# Patient Record
Sex: FEMALE | Race: WHITE | URBAN - METROPOLITAN AREA
[De-identification: names, ages, dates, MRNs, and addresses within clinical notes are randomized per-mention and may not be internally consistent; named-entity substitution may affect disease eponyms.]

---

## 2018-07-05 ENCOUNTER — APPOINTMENT (RX ONLY)
Dept: URBAN - METROPOLITAN AREA CLINIC 5 | Facility: CLINIC | Age: 63
Setting detail: DERMATOLOGY
End: 2018-07-05

## 2018-07-05 DIAGNOSIS — L81.4 OTHER MELANIN HYPERPIGMENTATION: ICD-10-CM

## 2018-07-05 DIAGNOSIS — L82.1 OTHER SEBORRHEIC KERATOSIS: ICD-10-CM

## 2018-07-05 DIAGNOSIS — D18.0 HEMANGIOMA: ICD-10-CM

## 2018-07-05 DIAGNOSIS — L73.8 OTHER SPECIFIED FOLLICULAR DISORDERS: ICD-10-CM

## 2018-07-05 DIAGNOSIS — B07.8 OTHER VIRAL WARTS: ICD-10-CM

## 2018-07-05 PROBLEM — D18.01 HEMANGIOMA OF SKIN AND SUBCUTANEOUS TISSUE: Status: ACTIVE | Noted: 2018-07-05

## 2018-07-05 PROCEDURE — ? COUNSELING

## 2018-07-05 PROCEDURE — 99203 OFFICE O/P NEW LOW 30 MIN: CPT

## 2018-07-05 ASSESSMENT — LOCATION ZONE DERM
LOCATION ZONE: FINGERNAIL
LOCATION ZONE: TRUNK
LOCATION ZONE: NOSE
LOCATION ZONE: LEG
LOCATION ZONE: FINGER

## 2018-07-05 ASSESSMENT — LOCATION DETAILED DESCRIPTION DERM
LOCATION DETAILED: RIGHT INDEX FINGERNAIL
LOCATION DETAILED: EPIGASTRIC SKIN
LOCATION DETAILED: LEFT INFERIOR LATERAL MIDBACK
LOCATION DETAILED: LEFT MID DORSAL INDEX FINGER
LOCATION DETAILED: LEFT MIDDLE FINGER PROXIMAL INTERPHALANGEAL JOINT
LOCATION DETAILED: NASAL TIP
LOCATION DETAILED: RIGHT ANTERIOR PROXIMAL THIGH

## 2018-07-05 ASSESSMENT — LOCATION SIMPLE DESCRIPTION DERM
LOCATION SIMPLE: NOSE
LOCATION SIMPLE: RIGHT THIGH
LOCATION SIMPLE: RIGHT INDEX FINGERNAIL
LOCATION SIMPLE: ABDOMEN
LOCATION SIMPLE: LEFT INDEX FINGER
LOCATION SIMPLE: LEFT MIDDLE FINGER
LOCATION SIMPLE: LEFT LOWER BACK

## 2018-07-05 NOTE — HPI: OTHER
Condition:: spot
Please Describe Your Condition:: One year history of asymptomatic scaly spot on left back that has enlarged.  No prior treatment.
Condition:: wart
Please Describe Your Condition:: Two month history of warts on left fingers and under right index fingernail.  No prior treatment.

## 2020-10-21 ENCOUNTER — APPOINTMENT (RX ONLY)
Dept: URBAN - METROPOLITAN AREA CLINIC 141 | Facility: CLINIC | Age: 65
Setting detail: DERMATOLOGY
End: 2020-10-21

## 2020-10-21 DIAGNOSIS — L73.9 FOLLICULAR DISORDER, UNSPECIFIED: ICD-10-CM

## 2020-10-21 DIAGNOSIS — L738 OTHER SPECIFIED DISEASES OF HAIR AND HAIR FOLLICLES: ICD-10-CM

## 2020-10-21 DIAGNOSIS — L663 OTHER SPECIFIED DISEASES OF HAIR AND HAIR FOLLICLES: ICD-10-CM

## 2020-10-21 DIAGNOSIS — L82.1 OTHER SEBORRHEIC KERATOSIS: ICD-10-CM

## 2020-10-21 DIAGNOSIS — L30.4 ERYTHEMA INTERTRIGO: ICD-10-CM

## 2020-10-21 PROBLEM — L02.821 FURUNCLE OF HEAD [ANY PART, EXCEPT FACE]: Status: ACTIVE | Noted: 2020-10-21

## 2020-10-21 PROBLEM — L02.222 FURUNCLE OF BACK [ANY PART, EXCEPT BUTTOCK]: Status: ACTIVE | Noted: 2020-10-21

## 2020-10-21 PROCEDURE — ? COUNSELING

## 2020-10-21 PROCEDURE — ? PRESCRIPTION

## 2020-10-21 PROCEDURE — 99214 OFFICE O/P EST MOD 30 MIN: CPT

## 2020-10-21 PROCEDURE — ? TREATMENT REGIMEN

## 2020-10-21 RX ORDER — FLUOCINOLONE ACETONIDE 0.1 MG/ML
SOLUTION TOPICAL
Qty: 1 | Refills: 0 | Status: ERX | COMMUNITY
Start: 2020-10-21

## 2020-10-21 RX ADMIN — FLUOCINOLONE ACETONIDE: 0.1 SOLUTION TOPICAL at 00:00

## 2020-10-21 ASSESSMENT — LOCATION DETAILED DESCRIPTION DERM
LOCATION DETAILED: RIGHT SUPERIOR UPPER BACK
LOCATION DETAILED: SUBXIPHOID
LOCATION DETAILED: LEFT MID-UPPER BACK
LOCATION DETAILED: MID-OCCIPITAL SCALP

## 2020-10-21 ASSESSMENT — LOCATION ZONE DERM
LOCATION ZONE: TRUNK
LOCATION ZONE: SCALP

## 2020-10-21 ASSESSMENT — PAIN INTENSITY VAS: HOW INTENSE IS YOUR PAIN 0 BEING NO PAIN, 10 BEING THE MOST SEVERE PAIN POSSIBLE?: NO PAIN

## 2020-10-21 ASSESSMENT — LOCATION SIMPLE DESCRIPTION DERM
LOCATION SIMPLE: ABDOMEN
LOCATION SIMPLE: LEFT UPPER BACK
LOCATION SIMPLE: POSTERIOR SCALP
LOCATION SIMPLE: RIGHT UPPER BACK

## 2020-10-21 NOTE — PROCEDURE: TREATMENT REGIMEN
Samples Given: neutrogena T-Sal shampoo x 2, apply to scalp once a week let sit 2-3 minutes on scalp before rinsing
Detail Level: Simple
Otc Regimen: aveeno anti itch lotion

## 2020-10-26 ENCOUNTER — RX ONLY (OUTPATIENT)
Age: 65
Setting detail: RX ONLY
End: 2020-10-26

## 2020-10-26 RX ORDER — DOXYCYCLINE HYCLATE 50 MG/1
CAPSULE, GELATIN COATED ORAL
Qty: 30 | Refills: 0 | Status: ERX | COMMUNITY
Start: 2020-10-26

## 2020-12-07 ENCOUNTER — APPOINTMENT (RX ONLY)
Dept: URBAN - METROPOLITAN AREA CLINIC 39 | Facility: CLINIC | Age: 65
Setting detail: DERMATOLOGY
End: 2020-12-07

## 2020-12-07 DIAGNOSIS — A60.9 ANOGENITAL HERPESVIRAL INFECTION, UNSPECIFIED: ICD-10-CM

## 2020-12-07 DIAGNOSIS — L738 OTHER SPECIFIED DISEASES OF HAIR AND HAIR FOLLICLES: ICD-10-CM

## 2020-12-07 DIAGNOSIS — B35.1 TINEA UNGUIUM: ICD-10-CM

## 2020-12-07 DIAGNOSIS — L73.9 FOLLICULAR DISORDER, UNSPECIFIED: ICD-10-CM

## 2020-12-07 DIAGNOSIS — L663 OTHER SPECIFIED DISEASES OF HAIR AND HAIR FOLLICLES: ICD-10-CM

## 2020-12-07 PROBLEM — L02.821 FURUNCLE OF HEAD [ANY PART, EXCEPT FACE]: Status: ACTIVE | Noted: 2020-12-07

## 2020-12-07 PROCEDURE — ? TREATMENT REGIMEN

## 2020-12-07 PROCEDURE — ? PRESCRIPTION

## 2020-12-07 PROCEDURE — 99213 OFFICE O/P EST LOW 20 MIN: CPT

## 2020-12-07 PROCEDURE — ? COUNSELING

## 2020-12-07 RX ORDER — SULFAMETHOXAZOLE AND TRIMETHOPRIM 800; 160 MG/1; MG/1
TABLET ORAL BID
Qty: 60 | Refills: 1 | Status: ERX | COMMUNITY
Start: 2020-12-07

## 2020-12-07 RX ORDER — CICLOPIROX 80 MG/ML
SOLUTION TOPICAL QD
Qty: 1 | Refills: 6 | Status: ERX | COMMUNITY
Start: 2020-12-07

## 2020-12-07 RX ORDER — VALACYCLOVIR HYDROCHLORIDE 500 MG/1
TABLET, FILM COATED ORAL
Qty: 18 | Refills: 3 | Status: ERX | COMMUNITY
Start: 2020-12-07

## 2020-12-07 RX ADMIN — CICLOPIROX: 80 SOLUTION TOPICAL at 00:00

## 2020-12-07 RX ADMIN — SULFAMETHOXAZOLE AND TRIMETHOPRIM: 800; 160 TABLET ORAL at 00:00

## 2020-12-07 RX ADMIN — VALACYCLOVIR HYDROCHLORIDE: 500 TABLET, FILM COATED ORAL at 00:00

## 2020-12-07 ASSESSMENT — PAIN INTENSITY VAS: HOW INTENSE IS YOUR PAIN 0 BEING NO PAIN, 10 BEING THE MOST SEVERE PAIN POSSIBLE?: NO PAIN

## 2020-12-07 ASSESSMENT — LOCATION DETAILED DESCRIPTION DERM
LOCATION DETAILED: LEFT INDEX FINGERNAIL
LOCATION DETAILED: LEFT SUPERIOR PARIETAL SCALP
LOCATION DETAILED: LEFT GREAT TOENAIL
LOCATION DETAILED: RIGHT INDEX FINGERNAIL
LOCATION DETAILED: LEFT MONS PUBIS

## 2020-12-07 ASSESSMENT — LOCATION SIMPLE DESCRIPTION DERM
LOCATION SIMPLE: SCALP
LOCATION SIMPLE: RIGHT INDEX FINGERNAIL
LOCATION SIMPLE: LEFT GREAT TOE
LOCATION SIMPLE: MONS PUBIS
LOCATION SIMPLE: LEFT INDEX FINGERNAIL

## 2020-12-07 ASSESSMENT — LOCATION ZONE DERM
LOCATION ZONE: VULVA
LOCATION ZONE: TOENAIL
LOCATION ZONE: SCALP
LOCATION ZONE: FINGERNAIL

## 2020-12-07 NOTE — PROCEDURE: TREATMENT REGIMEN
Plan: She was adamant to treat. 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 She has taken Bactrim without AE's prior and desired to repeat. Risk of skin and other multi-organ reactions, some very serious discussed.
Detail Level: Simple
Samples Given: CLN shampoo with understanding it may remove hair color to some degree.

## 2020-12-07 NOTE — PROCEDURE: COUNSELING
Detail Level: Simple
Detail Level: Zone
Patient Specific Counseling (Will Not Stick From Patient To Patient): Systemic deferred as given new Bactirm Rx already. Weekly diflucan may be future option with least AE/interaction risks.

## 2021-03-16 ENCOUNTER — APPOINTMENT (RX ONLY)
Dept: URBAN - METROPOLITAN AREA CLINIC 39 | Facility: CLINIC | Age: 66
Setting detail: DERMATOLOGY
End: 2021-03-16

## 2021-03-16 DIAGNOSIS — L73.9 FOLLICULAR DISORDER, UNSPECIFIED: ICD-10-CM

## 2021-03-16 DIAGNOSIS — L82.1 OTHER SEBORRHEIC KERATOSIS: ICD-10-CM

## 2021-03-16 DIAGNOSIS — L30.9 DERMATITIS, UNSPECIFIED: ICD-10-CM | Status: INADEQUATELY CONTROLLED

## 2021-03-16 DIAGNOSIS — L738 OTHER SPECIFIED DISEASES OF HAIR AND HAIR FOLLICLES: ICD-10-CM

## 2021-03-16 DIAGNOSIS — L663 OTHER SPECIFIED DISEASES OF HAIR AND HAIR FOLLICLES: ICD-10-CM

## 2021-03-16 PROBLEM — L02.821 FURUNCLE OF HEAD [ANY PART, EXCEPT FACE]: Status: ACTIVE | Noted: 2021-03-16

## 2021-03-16 PROCEDURE — 99213 OFFICE O/P EST LOW 20 MIN: CPT

## 2021-03-16 PROCEDURE — ? TREATMENT REGIMEN

## 2021-03-16 PROCEDURE — ? COUNSELING

## 2021-03-16 ASSESSMENT — LOCATION ZONE DERM
LOCATION ZONE: SCALP
LOCATION ZONE: TRUNK

## 2021-03-16 ASSESSMENT — LOCATION DETAILED DESCRIPTION DERM
LOCATION DETAILED: LEFT SUPERIOR PARIETAL SCALP
LOCATION DETAILED: RIGHT BUTTOCK
LOCATION DETAILED: SUPERIOR LUMBAR SPINE

## 2021-03-16 ASSESSMENT — LOCATION SIMPLE DESCRIPTION DERM
LOCATION SIMPLE: LOWER BACK
LOCATION SIMPLE: SCALP
LOCATION SIMPLE: RIGHT BUTTOCK

## 2021-03-16 ASSESSMENT — PAIN INTENSITY VAS: HOW INTENSE IS YOUR PAIN 0 BEING NO PAIN, 10 BEING THE MOST SEVERE PAIN POSSIBLE?: NO PAIN

## 2021-03-16 NOTE — HPI: RASH
Is The Patient Presenting As Previously Scheduled?: Yes
How Severe Is Your Rash?: moderate
Is This A New Presentation, Or A Follow-Up?: Rash
Additional History: Eruption is not present currently and she declined other than cursory exam. Photos on her phone show urticarial plaques on abdomen x 2 and a medial gluteal small erosion with surrounding PIHP. Photos of lesions hairline were out of focus.

## 2021-03-16 NOTE — PROCEDURE: TREATMENT REGIMEN
Detail Level: Detailed
Plan: Pt is to call/return to office once she has an active lesion, specificall like the  pink plaques on the abdomen seen on phone photo, to get a biopsy with DIF.

## 2021-03-16 NOTE — PROCEDURE: COUNSELING
Detail Level: Zone
Patient Specific Counseling (Will Not Stick From Patient To Patient): Discouraged taking Bactrim long term. As above, would be helpful to see acute outbreak.
Detail Level: Simple

## 2021-03-18 RX ORDER — SULFAMETHOXAZOLE AND TRIMETHOPRIM 800; 160 MG/1; MG/1
TABLET ORAL BID
Qty: 60 | Refills: 1 | Status: ERX | COMMUNITY
Start: 2021-03-18

## 2021-03-18 RX ADMIN — SULFAMETHOXAZOLE AND TRIMETHOPRIM: 800; 160 TABLET ORAL at 00:00

## 2021-03-25 ENCOUNTER — APPOINTMENT (RX ONLY)
Dept: URBAN - METROPOLITAN AREA CLINIC 60 | Facility: CLINIC | Age: 66
Setting detail: DERMATOLOGY
End: 2021-03-25

## 2021-03-25 DIAGNOSIS — Z41.9 ENCOUNTER FOR PROCEDURE FOR PURPOSES OTHER THAN REMEDYING HEALTH STATE, UNSPECIFIED: ICD-10-CM

## 2021-03-25 PROCEDURE — ? CHEMICAL PEEL

## 2021-03-25 ASSESSMENT — LOCATION SIMPLE DESCRIPTION DERM
LOCATION SIMPLE: INFERIOR FOREHEAD
LOCATION SIMPLE: LEFT CHEEK
LOCATION SIMPLE: NOSE
LOCATION SIMPLE: RIGHT CHEEK

## 2021-03-25 ASSESSMENT — LOCATION ZONE DERM
LOCATION ZONE: NOSE
LOCATION ZONE: FACE

## 2021-03-25 ASSESSMENT — LOCATION DETAILED DESCRIPTION DERM
LOCATION DETAILED: LEFT INFERIOR CENTRAL MALAR CHEEK
LOCATION DETAILED: NASAL DORSUM
LOCATION DETAILED: INFERIOR MID FOREHEAD
LOCATION DETAILED: RIGHT INFERIOR CENTRAL MALAR CHEEK

## 2021-03-25 NOTE — PROCEDURE: CHEMICAL PEEL
Detail Level: Zone
Chemical Peel: Micropeel 30 Solution
Treatment Number: 1
Number Of Layers: 2
Prep: Cleansed, steamed, extracted, prepped with alcohol wipe
Post Peel Care: After the procedure, a post-peel cream was applied to the treated areas. Sun protection and post-care instructions were reviewed with the patient.
Consent: Prior to the procedure, written consent was obtained and risks were reviewed, including but not limited to: redness, peeling, blistering, pigmentary change, scarring, infection, and pain.
Comments: One layer micropeel 30% glycolic, with one layer micropeel salicylic 30% on top-post extractions. Finished with hydrating b5 gel, moisturizer and ZO smart tone tinted SPF. \\n\\npt mentioned she doesn't do a whole lot at home, cleanses only once a day if that. uses [employer] products. Wants eye surgery for puffiness. I recommended that she cleanse daily, and use a retinol. She went home with skin medica retinol 0.5%. Pre-booked for IPL on the chest, neck, and face.
Post-Care Instructions: I reviewed with the patient in detail post-care instructions. Patient should avoid sun exposure and wear sun protection.

## 2021-04-09 ENCOUNTER — APPOINTMENT (RX ONLY)
Dept: URBAN - METROPOLITAN AREA CLINIC 60 | Facility: CLINIC | Age: 66
Setting detail: DERMATOLOGY
End: 2021-04-09

## 2021-04-09 DIAGNOSIS — Z41.9 ENCOUNTER FOR PROCEDURE FOR PURPOSES OTHER THAN REMEDYING HEALTH STATE, UNSPECIFIED: ICD-10-CM

## 2021-04-09 PROCEDURE — ? LUMENIS M22

## 2021-04-09 ASSESSMENT — LOCATION SIMPLE DESCRIPTION DERM
LOCATION SIMPLE: NECK
LOCATION SIMPLE: CHIN
LOCATION SIMPLE: CHEST
LOCATION SIMPLE: NOSE
LOCATION SIMPLE: LEFT SUBMANDIBULAR AREA
LOCATION SIMPLE: RIGHT SUBMANDIBULAR AREA
LOCATION SIMPLE: RIGHT CHEEK
LOCATION SIMPLE: LEFT CHEEK
LOCATION SIMPLE: SUBMENTAL CHIN
LOCATION SIMPLE: INFERIOR FOREHEAD

## 2021-04-09 ASSESSMENT — LOCATION ZONE DERM
LOCATION ZONE: FACE
LOCATION ZONE: NOSE
LOCATION ZONE: NECK
LOCATION ZONE: TRUNK

## 2021-04-09 ASSESSMENT — LOCATION DETAILED DESCRIPTION DERM
LOCATION DETAILED: LEFT CENTRAL LATERAL NECK
LOCATION DETAILED: RIGHT INFERIOR LATERAL NECK
LOCATION DETAILED: RIGHT SUBMANDIBULAR AREA
LOCATION DETAILED: RIGHT CENTRAL LATERAL NECK
LOCATION DETAILED: RIGHT INFERIOR CENTRAL MALAR CHEEK
LOCATION DETAILED: SUBMENTAL CHIN
LOCATION DETAILED: LEFT CENTRAL ANTERIOR NECK
LOCATION DETAILED: INFERIOR MID FOREHEAD
LOCATION DETAILED: RIGHT CHIN
LOCATION DETAILED: RIGHT CENTRAL ANTERIOR NECK
LOCATION DETAILED: RIGHT LATERAL SUPERIOR CHEST
LOCATION DETAILED: LEFT LATERAL SUPERIOR CHEST
LOCATION DETAILED: NASAL DORSUM
LOCATION DETAILED: UPPER STERNUM
LOCATION DETAILED: LEFT SUPERIOR LATERAL NECK
LOCATION DETAILED: RIGHT SUPERIOR LATERAL NECK
LOCATION DETAILED: LEFT INFERIOR LATERAL NECK
LOCATION DETAILED: LEFT INFERIOR CENTRAL MALAR CHEEK
LOCATION DETAILED: LEFT SUBMANDIBULAR AREA

## 2021-04-09 NOTE — PROCEDURE: LUMENIS M22
Procedure Text: The patient's skin was cleaned and the procedure was performed using the following parameters \\nskin type: 3\\nFull Chest IPL Single Tx \\n1st pass: full chest large tip, age spots/telang. NF, shallow, double pulse, 560 filter @ 16jc/m2. Skin's response: erythema right away applied 1% hydrocortisone cream and SPF. \\n2nd pass: small tip, single pulse, hyperpigmentation-light-epidermal, 515 filter @ 14 j/cm\\nFull Neck IPL \\n1st pass: large tip, age spots/telang. NF, shallow, double pulse 560 filter @ 16j/cm well tolerated. \\n2nd pass: spot tx small tip, vascular lesion, NF facial telang, shallow, single pulse, 515 filter @ 17j/cm\\napplied 1% hydrocortisone cream & SPF\\nFull Face IPL \\n1st pass: large tip, age spots/telang. moderate, shallow depth, double pulse, 560 @ 16 j/cm well tolerated.\\n2nd pass: spot tx, small tip, vascular lesions (facial telang), 515 filter @ 16 j/cm\\n3rd pass: spot tx, small tip, pigmented lesions (hyperpigmentation-light-epidermal) 560 filter at14j/cm\\n
External Cooling Fan Speed: 0
Treated Area: large area
Post-Care Instructions: I reviewed with the patient in detail post-care instructions. Patient should stay away from the sun and wear sun protection until treated areas are fully healed.
Fluence Units: J/cm2
Skin Type (Optional): III
Detail Level: Generalized
Consent: Written consent obtained, risks reviewed including but not limited to crusting, scabbing, blistering, scarring, darker or lighter pigmentary change, bruising, and/or incomplete response.
Treatment Number: 1

## 2021-05-04 ENCOUNTER — APPOINTMENT (RX ONLY)
Dept: URBAN - METROPOLITAN AREA CLINIC 60 | Facility: CLINIC | Age: 66
Setting detail: DERMATOLOGY
End: 2021-05-04

## 2021-05-04 DIAGNOSIS — Z41.9 ENCOUNTER FOR PROCEDURE FOR PURPOSES OTHER THAN REMEDYING HEALTH STATE, UNSPECIFIED: ICD-10-CM

## 2021-05-04 PROCEDURE — ? CHEMICAL PEEL

## 2021-05-04 ASSESSMENT — LOCATION ZONE DERM
LOCATION ZONE: NOSE
LOCATION ZONE: FACE

## 2021-05-04 ASSESSMENT — LOCATION SIMPLE DESCRIPTION DERM
LOCATION SIMPLE: INFERIOR FOREHEAD
LOCATION SIMPLE: NOSE
LOCATION SIMPLE: RIGHT CHEEK
LOCATION SIMPLE: LEFT CHEEK

## 2021-05-04 ASSESSMENT — LOCATION DETAILED DESCRIPTION DERM
LOCATION DETAILED: INFERIOR MID FOREHEAD
LOCATION DETAILED: LEFT CENTRAL MALAR CHEEK
LOCATION DETAILED: RIGHT MEDIAL MALAR CHEEK
LOCATION DETAILED: NASAL DORSUM

## 2021-05-04 NOTE — PROCEDURE: CHEMICAL PEEL
Treatment Number: 0
Post-Care Instructions: -I reviewed with the patient in detail post-care instructions. Patient should avoid sun exposure and wear sun protection.\\n-I advised the patient to discontinue retinoid use for 1 week post tx \\n-I recommended pt receive nxt tx 4 wks from todays visit.
Prep: -Steamed\\nd/Cleansed with zo gentle cleanser/clarisonic, manually exfoliated w/ zo exfoliating polish/activator, applied extra strength enyzme therapy mixed w/ softening gel occluded eyes nose & lips. Prepped w/isopropyl alcohol and applied one layer of Skin ceuticals advanced corrective peel (14% sal, 14% lac, 10% phenylethyl resorcinol)
Post Peel Care: -After the procedure, I applied ZO daily power defense, skinbetter interfuse eye gel, PCA's hyaluronic acid boosting serum for the lips, and ZO smart tone SPF. Sun protection and post-care instructions were reviewed with the patient.
Detail Level: Zone
Consent: Prior to the procedure, written consent was obtained and risks were reviewed, including but not limited to: redness, peeling, blistering, pigmentary change, scarring, infection, and pain.
Number Of Layers: until gone
Chemical Peel: Skinceuticals Advanced Corrective Peel 14% Salicylic Acid
Comments: This is pt's third visit with me. \\nfirst visit we did 1 layer 30% BHA micropeel, followed by 1 layer 30% AHA micropeel skin was resilient\\nsecond visit we treated sun damage and vessels with IPL\\nThis visit we applied the skin ceutical's advanced corrective peel post scrub/enzyme exfoliation. Skin response was little frosting & mild erythema. \\nPt expressed an interest in continuing peels through this summer\\nPt swims laps in her pool & other than that stays out of direct sun exposure\\nPt states she is good about wearing a physical block SPF\\nIf pt does not try skin medica peels next visit, continue with ZO exf polish, bioelements enyme therapy, & 1 layer skin ceuticals advanced corrective

## 2021-08-30 ENCOUNTER — APPOINTMENT (RX ONLY)
Dept: URBAN - METROPOLITAN AREA CLINIC 60 | Facility: CLINIC | Age: 66
Setting detail: DERMATOLOGY
End: 2021-08-30

## 2021-08-30 DIAGNOSIS — Z41.9 ENCOUNTER FOR PROCEDURE FOR PURPOSES OTHER THAN REMEDYING HEALTH STATE, UNSPECIFIED: ICD-10-CM

## 2021-08-30 PROCEDURE — ? DERMAPLANE

## 2021-08-30 ASSESSMENT — LOCATION ZONE DERM: LOCATION ZONE: FACE

## 2021-08-30 ASSESSMENT — LOCATION SIMPLE DESCRIPTION DERM: LOCATION SIMPLE: INFERIOR FOREHEAD

## 2021-08-30 ASSESSMENT — LOCATION DETAILED DESCRIPTION DERM: LOCATION DETAILED: INFERIOR MID FOREHEAD

## 2021-08-30 NOTE — PROCEDURE: DERMAPLANE
Detail Level: Zone
Treatment Area Prep: alcohol
Blade: 10 blade scalpel
Pre-Procedure Text: The patient was steamed/cleansed with ... cleanser
Post-Care Instructions: I reviewed with the patient in detail post-care instructions. Wear SPF \\ndiscontinue retinoids/exfoliating treatments for 5-7 days. Return 4 wks from today.
Treatment Areas: face

## 2022-09-01 ENCOUNTER — APPOINTMENT (RX ONLY)
Dept: URBAN - METROPOLITAN AREA CLINIC 60 | Facility: CLINIC | Age: 67
Setting detail: DERMATOLOGY
End: 2022-09-01

## 2022-09-01 DIAGNOSIS — Z41.9 ENCOUNTER FOR PROCEDURE FOR PURPOSES OTHER THAN REMEDYING HEALTH STATE, UNSPECIFIED: ICD-10-CM

## 2022-09-01 PROCEDURE — ? HYDRAFACIAL

## 2022-09-01 ASSESSMENT — LOCATION ZONE DERM: LOCATION ZONE: FACE

## 2022-09-01 ASSESSMENT — LOCATION DETAILED DESCRIPTION DERM
LOCATION DETAILED: LEFT INFERIOR CENTRAL MALAR CHEEK
LOCATION DETAILED: RIGHT INFERIOR MEDIAL FOREHEAD
LOCATION DETAILED: RIGHT MENTAL CREASE
LOCATION DETAILED: RIGHT INFERIOR CENTRAL MALAR CHEEK

## 2022-09-01 ASSESSMENT — LOCATION SIMPLE DESCRIPTION DERM
LOCATION SIMPLE: RIGHT FOREHEAD
LOCATION SIMPLE: RIGHT CHEEK
LOCATION SIMPLE: LEFT CHEEK
LOCATION SIMPLE: CHIN

## 2022-09-01 NOTE — PROCEDURE: HYDRAFACIAL
Solution Override: Rozatrol
Tip Override
Vacuum Pressure High Setting (Will Not Render If Set To 0): 0
Post-Care Instructions: I reviewed with the patient in detail post-care instructions. Patient should stay away from the sun and wear sun protection until treated areas are fully healed.\\n\\nFinishing products: Daily Power Defense, Brightalive, SM essential defense tinted SPF, SB interfuse eye gel, PCA hyaluronic acid boosting serum for the lips.
Number Of Passes: 1
Procedure: Extraction
Solution: GlySal 7.5%
Tip: Hydropeel Tip, Clear
Solution Override
Vacuum Pressure Low Setting (Will Not Render If Set To 0): 14
Procedure: Extend and Protect
Vacuum Pressure Low Setting (Will Not Render If Set To 0): 17
Comments: dermaplane add on. started new product w/ retinol tender on neck. glysal PEEL solution used face only
Tip: Hydropeel Tip, Blue
Procedure: Boost
Solution: Beta-HD
Indication: anti-aging
Vacuum Pressure Low Setting (Will Not Render If Set To 0): 12
Solution: Antiox-6
Procedure: Exfoliation
Vacuum Pressure Low Setting (Will Not Render If Set To 0): 16
Tip: Hydropeel Tip, Teal
Location: face
Vacuum Pressure Low Setting (Will Not Render If Set To 0): 14
Procedure: Peel
Solution: Activ-4
Consent: Written consent obtained, risks reviewed including but not limited to crusting, scabbing, blistering, scarring, darker or lighter pigmentary change, bruising, and/or incomplete response.
Procedure: Fusion

## 2022-09-26 ENCOUNTER — APPOINTMENT (RX ONLY)
Dept: URBAN - METROPOLITAN AREA CLINIC 60 | Facility: CLINIC | Age: 67
Setting detail: DERMATOLOGY
End: 2022-09-26

## 2022-09-26 DIAGNOSIS — Z41.9 ENCOUNTER FOR PROCEDURE FOR PURPOSES OTHER THAN REMEDYING HEALTH STATE, UNSPECIFIED: ICD-10-CM

## 2022-09-26 PROCEDURE — ? HYDRAFACIAL

## 2022-09-26 NOTE — PROCEDURE: HYDRAFACIAL
Location: face
Number Of Passes: 1
Number Of Passes: 0
Tip: Hydropeel Tip, Teal
Tip: Hydropeel Tip, Clear
Tip Override
Solution: GlySal 7.5%
Solution: Activ-4
Vacuum Pressure Low Setting (Will Not Render If Set To 0): 14
Consent: Written consent obtained, risks reviewed including but not limited to crusting, scabbing, blistering, scarring, darker or lighter pigmentary change, bruising, and/or incomplete response.
Procedure: Boost
Procedure: Extraction
Post-Care Instructions: I reviewed with the patient in detail post-care instructions. Patient should stay away from the sun and wear sun protection until treated areas are fully healed.\\n\\nFinishing products: Daily Power Defense, Brightalive, SM essential defense tinted SPF, SB interfuse eye gel, PCA hyaluronic acid boosting serum for the lips.
Vacuum Pressure Low Setting (Will Not Render If Set To 0): 14
Procedure: Fusion
Vacuum Pressure Low Setting (Will Not Render If Set To 0): 12
Tip: Hydropeel Tip, Blue
Procedure: Extend and Protect
Solution: Beta-HD
Vacuum Pressure Low Setting (Will Not Render If Set To 0): 17
Solution Override
Procedure: Exfoliation
Indication: anti-aging
Vacuum Pressure Low Setting (Will Not Render If Set To 0): 16
Solution: Antiox-6
Solution Override: Rozatrol
Procedure: Peel

## 2023-02-21 ENCOUNTER — APPOINTMENT (RX ONLY)
Dept: URBAN - METROPOLITAN AREA CLINIC 60 | Facility: CLINIC | Age: 68
Setting detail: DERMATOLOGY
End: 2023-02-21

## 2023-02-21 DIAGNOSIS — Z41.9 ENCOUNTER FOR PROCEDURE FOR PURPOSES OTHER THAN REMEDYING HEALTH STATE, UNSPECIFIED: ICD-10-CM

## 2023-02-21 PROCEDURE — ? MICRODERMABRASION

## 2023-02-21 ASSESSMENT — LOCATION DETAILED DESCRIPTION DERM
LOCATION DETAILED: LEFT INFERIOR MEDIAL MALAR CHEEK
LOCATION DETAILED: RIGHT LOWER CUTANEOUS LIP
LOCATION DETAILED: INFERIOR MID FOREHEAD
LOCATION DETAILED: RIGHT INFERIOR CENTRAL MALAR CHEEK

## 2023-02-21 ASSESSMENT — LOCATION ZONE DERM
LOCATION ZONE: FACE
LOCATION ZONE: LIP

## 2023-02-21 ASSESSMENT — LOCATION SIMPLE DESCRIPTION DERM
LOCATION SIMPLE: LEFT CHEEK
LOCATION SIMPLE: INFERIOR FOREHEAD
LOCATION SIMPLE: RIGHT LIP
LOCATION SIMPLE: RIGHT CHEEK

## 2023-02-21 NOTE — PROCEDURE: MICRODERMABRASION
Detail Level: Generalized
Indication: skin texture
Treatment Number: 1
Endpoint: mild erythema
Wand: Medium
Vacuum Pressure Units: inches Hg
Prep Text: PASSES: 1\\nWAND: Medium\\nTIPS: 2A/100m used on under-eyes+nose, 5s or 300m for rest of face.\\nVAC PRESSURE: 10 inches Hg\\nCLEANSE: ZO gentle cleanser\\nPREP: isopropyl alcohol\\nSTEAM: before extractions \\nEXTRACTIONS: PCA detox gel w/ cotton-tipped applicators and/or lancet and/or cotton 3x3s\\nMASK(s): purifying mask\\nHYDRATOR: ZO Daily Power Defense\\nSPF: ZO daily sheer SPF
Post-Care Instructions: I reviewed with the patient in detail post-care instructions.
Consent: Written consent obtained, risks reviewed including but not limited to crusting, scabbing, blistering, scarring, darker or lighter pigmentary change, bruising, and/or incomplete response.
Vacuum Pressure: 10

## 2023-06-07 ENCOUNTER — APPOINTMENT (RX ONLY)
Dept: URBAN - METROPOLITAN AREA CLINIC 58 | Facility: CLINIC | Age: 68
Setting detail: DERMATOLOGY
End: 2023-06-07

## 2023-06-07 DIAGNOSIS — Z41.9 ENCOUNTER FOR PROCEDURE FOR PURPOSES OTHER THAN REMEDYING HEALTH STATE, UNSPECIFIED: ICD-10-CM

## 2023-06-07 PROCEDURE — ? CHEMICAL PEEL

## 2023-06-28 ENCOUNTER — APPOINTMENT (RX ONLY)
Dept: URBAN - METROPOLITAN AREA CLINIC 141 | Facility: CLINIC | Age: 68
Setting detail: DERMATOLOGY
End: 2023-06-28

## 2023-06-28 DIAGNOSIS — L93.2 OTHER LOCAL LUPUS ERYTHEMATOSUS: ICD-10-CM | Status: INADEQUATELY CONTROLLED

## 2023-06-28 PROBLEM — L30.9 DERMATITIS, UNSPECIFIED: Status: ACTIVE | Noted: 2023-06-28

## 2023-06-28 PROCEDURE — 11104 PUNCH BX SKIN SINGLE LESION: CPT

## 2023-06-28 PROCEDURE — ? SEPARATE AND IDENTIFIABLE DOCUMENTATION

## 2023-06-28 PROCEDURE — ? TREATMENT REGIMEN

## 2023-06-28 PROCEDURE — 99213 OFFICE O/P EST LOW 20 MIN: CPT | Mod: 25

## 2023-06-28 PROCEDURE — ? BIOPSY BY PUNCH METHOD

## 2023-06-28 PROCEDURE — 11105 PUNCH BX SKIN EA SEP/ADDL: CPT

## 2023-06-28 PROCEDURE — ? BIOPSY BY PUNCH METHOD FOR DIF

## 2023-06-28 ASSESSMENT — LOCATION ZONE DERM
LOCATION ZONE: TRUNK
LOCATION ZONE: NECK

## 2023-06-28 ASSESSMENT — LOCATION SIMPLE DESCRIPTION DERM
LOCATION SIMPLE: LEFT ANTERIOR NECK
LOCATION SIMPLE: LEFT CLAVICULAR SKIN

## 2023-06-28 ASSESSMENT — LOCATION DETAILED DESCRIPTION DERM
LOCATION DETAILED: LEFT CLAVICULAR SKIN
LOCATION DETAILED: LEFT CLAVICULAR NECK

## 2023-06-28 NOTE — PROCEDURE: BIOPSY BY PUNCH METHOD FOR DIF
Detail Level: Detailed
Was A Bandage Applied: Yes
Lab: [id number]
Punch Size In Mm: 4
Size Of Lesion In Cm (Optional): 0
Depth Of Punch Biopsy: dermis
Biopsy Type: DIF (perilesional)
Anesthesia Type: 1% lidocaine with epinephrine
Anesthesia Volume In Cc (Will Not Render If 0): 0.5
Hemostasis: None
Epidermal Sutures: 4-0 Ethilon
Wound Care: Petrolatum
Dressing: bandage
Suture Removal: 14 days
Patient Will Remove Sutures At Home?: No
Consent: Written consent was obtained and risks were reviewed including but not limited to scarring, infection, bleeding, scabbing, incomplete removal, nerve damage and allergy to anesthesia.
Post-Care Instructions: I reviewed with the patient in detail post-care instructions. Patient is to keep the biopsy site dry overnight, and then apply bacitracin twice daily until healed. Patient may apply hydrogen peroxide soaks to remove any crusting.
Home Suture Removal Text: Patient was provided a home suture removal kit and will remove their sutures at home.  If they have any questions or difficulties they will call the office.
Notification Instructions: Patient will be notified of biopsy results. However, patient instructed to call the office if not contacted within 2 weeks.
Billing Type: Third-Party Bill

## 2023-06-28 NOTE — PROCEDURE: BIOPSY BY PUNCH METHOD
Detail Level: Detailed
Was A Bandage Applied: Yes
Lab: [id number]
Punch Size In Mm: 3
Size Of Lesion In Cm (Optional): 0
Depth Of Punch Biopsy: dermis
Biopsy Type: H and E
Anesthesia Type: 1% lidocaine with epinephrine and a 1:10 solution of 8.4% sodium bicarbonate
Anesthesia Volume In Cc (Will Not Render If 0): 1
Hemostasis: None
Epidermal Sutures: 4-0 Ethilon
Wound Care: Vaseline
Dressing: bandage
Suture Removal: 7 days
Patient Will Remove Sutures At Home?: No
Consent: Written consent was obtained and risks were reviewed including but not limited to scarring, infection, bleeding, scabbing, incomplete removal, nerve damage and allergy to anesthesia.
Post-Care Instructions: I reviewed with the patient in detail post-care instructions. Patient is to keep the biopsy site dry overnight, and then apply bacitracin twice daily until healed. Patient may apply hydrogen peroxide soaks to remove any crusting.
Home Suture Removal Text: Patient was provided a home suture removal kit and will remove their sutures at home.  If they have any questions or difficulties they will call the office.
Notification Instructions: Patient will be notified of biopsy results. However, patient instructed to call the office if not contacted within 2 weeks.
Billing Type: Third-Party Bill
Information: Selecting Yes will display possible errors in your note based on the variables you have selected. This validation is only offered as a suggestion for you. PLEASE NOTE THAT THE VALIDATION TEXT WILL BE REMOVED WHEN YOU FINALIZE YOUR NOTE. IF YOU WANT TO FAX A PRELIMINARY NOTE YOU WILL NEED TO TOGGLE THIS TO 'NO' IF YOU DO NOT WANT IT IN YOUR FAXED NOTE.
Lab Facility: [id number]

## 2023-06-28 NOTE — PROCEDURE: TREATMENT REGIMEN
Detail Level: Detailed
Plan: Moisturize and use OTC or available prescription steroids for the time being.  Pending results, will see if we can add to her her rheumatologic workup.

## 2023-07-05 ENCOUNTER — APPOINTMENT (RX ONLY)
Dept: URBAN - METROPOLITAN AREA CLINIC 141 | Facility: CLINIC | Age: 68
Setting detail: DERMATOLOGY
End: 2023-07-05

## 2023-07-05 DIAGNOSIS — Z48.02 ENCOUNTER FOR REMOVAL OF SUTURES: ICD-10-CM

## 2023-07-05 PROCEDURE — 99024 POSTOP FOLLOW-UP VISIT: CPT

## 2023-07-05 PROCEDURE — ? SUTURE REMOVAL (GLOBAL PERIOD)

## 2023-07-05 ASSESSMENT — LOCATION SIMPLE DESCRIPTION DERM
LOCATION SIMPLE: LEFT CLAVICULAR SKIN
LOCATION SIMPLE: LEFT ANTERIOR NECK

## 2023-07-05 ASSESSMENT — PAIN INTENSITY VAS: HOW INTENSE IS YOUR PAIN 0 BEING NO PAIN, 10 BEING THE MOST SEVERE PAIN POSSIBLE?: NO PAIN

## 2023-07-05 ASSESSMENT — LOCATION ZONE DERM
LOCATION ZONE: NECK
LOCATION ZONE: TRUNK

## 2023-07-05 ASSESSMENT — LOCATION DETAILED DESCRIPTION DERM
LOCATION DETAILED: LEFT CLAVICULAR NECK
LOCATION DETAILED: LEFT CLAVICULAR SKIN

## 2023-07-05 NOTE — PROCEDURE: SUTURE REMOVAL (GLOBAL PERIOD)
Detail Level: Detailed
Add 70946 Cpt? (Important Note: In 2017 The Use Of 76076 Is Being Tracked By Cms To Determine Future Global Period Reimbursement For Global Periods): yes

## 2023-07-17 ENCOUNTER — APPOINTMENT (RX ONLY)
Dept: URBAN - METROPOLITAN AREA CLINIC 58 | Facility: CLINIC | Age: 68
Setting detail: DERMATOLOGY
End: 2023-07-17

## 2023-07-17 DIAGNOSIS — Z41.9 ENCOUNTER FOR PROCEDURE FOR PURPOSES OTHER THAN REMEDYING HEALTH STATE, UNSPECIFIED: ICD-10-CM

## 2023-07-17 PROCEDURE — ? CHEMICAL PEEL

## 2023-07-17 NOTE — PROCEDURE: CHEMICAL PEEL
Detail Level: Zone
Chemical Peel: PCA Peel
Prep: The treated area was degreased with Alcohol.
Post-Care Instructions: I reviewed with the patient in detail post-care instructions. Patient should avoid sun exposure and wear sun protection. Also avoid any excessive work outs for 48 hrs.
Number Of Layers: 2
Post Peel Care: Sun protection and post-care instructions were reviewed with the patient as well as expectations.
Treatment Number: 0
Comments: Anu had no concerns or complaints from last treatment. No changes in routine. Same exact protocol and product use as last time. Skin did well
Consent: Prior to the procedure, written consent was obtained and risks were reviewed, including but not limited to: redness, peeling, blistering, pigmentary change, scarring, infection, and pain.

## 2023-07-19 ENCOUNTER — RX ONLY (OUTPATIENT)
Age: 68
Setting detail: RX ONLY
End: 2023-07-19

## 2023-07-19 RX ORDER — TACROLIMUS 1 MG/G
OINTMENT TOPICAL
Qty: 30 | Refills: 5 | Status: ERX | COMMUNITY
Start: 2023-07-19

## 2023-08-21 ENCOUNTER — APPOINTMENT (RX ONLY)
Dept: URBAN - METROPOLITAN AREA CLINIC 58 | Facility: CLINIC | Age: 68
Setting detail: DERMATOLOGY
End: 2023-08-21

## 2023-08-21 DIAGNOSIS — Z41.9 ENCOUNTER FOR PROCEDURE FOR PURPOSES OTHER THAN REMEDYING HEALTH STATE, UNSPECIFIED: ICD-10-CM

## 2023-08-21 PROCEDURE — ? CHEMICAL PEEL

## 2023-08-21 NOTE — PROCEDURE: CHEMICAL PEEL
Chemical Peel: PCA Peel
Post Peel Care: Sun protection and post-care instructions were reviewed with the patient as well as expectations.
Post-Care Instructions: I reviewed with the patient in detail post-care instructions. Patient should avoid sun exposure and wear sun protection. Also avoid any excessive work outs for 48 hrs.
Number Of Layers: 1
Prep: The treated area was degreased with PCA Smoothing Toner.
Detail Level: Zone
Consent: Prior to the procedure, written consent was obtained and risks were reviewed, including but not limited to: redness, peeling, blistering, pigmentary change, scarring, infection, and pain.
Comments: Anu had no concerns or complaints from her last treatment. No changes in routine. Same procedure as last time. Cleanse, Polish, PCA Detox, all under steam. Exts in tzone. Dermaplane lip only. Tweezed a few brow hairs, wax next time? Followed with ZO Sheet mask, shoulder massage. Prepped skin. One heavy pass of PCA NoPeel Peel. Skin did well with little to no erythema. Finished with SPF. Precautions reviewed.
Treatment Number: 3

## 2024-04-01 ENCOUNTER — APPOINTMENT (RX ONLY)
Dept: URBAN - METROPOLITAN AREA CLINIC 58 | Facility: CLINIC | Age: 69
Setting detail: DERMATOLOGY
End: 2024-04-01

## 2024-04-01 DIAGNOSIS — Z41.9 ENCOUNTER FOR PROCEDURE FOR PURPOSES OTHER THAN REMEDYING HEALTH STATE, UNSPECIFIED: ICD-10-CM

## 2024-04-01 PROCEDURE — ? CHEMICAL PEEL

## 2024-04-01 NOTE — PROCEDURE: CHEMICAL PEEL
Consent: Prior to the procedure, written consent was obtained and risks were reviewed, including but not limited to: redness, peeling, blistering, pigmentary change, scarring, infection, and pain.
Treatment Number: 5
Post Peel Care: Sun protection and post-care instructions were reviewed with the patient as well as expectations.
Number Of Layers: 1
Prep: The treated area was degreased with PCA Smoothing Toner.
Comments: nAu is in today for another Chemical Peel treatment. No changes in routine. Wanting to focus on extractions today. Cleanse, polish, PCA detox under steam. Exts. Zo sheet mask, scalp massage. Prepped. Applied one heavy pass of PCA NoPeel to skin. Skin did well. Finished with SPF. \\nHas rash on neck/shoulders caused by Lupus. That area was avoided.
Chemical Peel: PCA Peel
Detail Level: Zone
Post-Care Instructions: I reviewed with the patient in detail post-care instructions. Patient should avoid sun exposure and wear sun protection. Also avoid any excessive work outs for 48 hrs.

## 2024-05-23 ENCOUNTER — APPOINTMENT (RX ONLY)
Dept: URBAN - METROPOLITAN AREA CLINIC 58 | Facility: CLINIC | Age: 69
Setting detail: DERMATOLOGY
End: 2024-05-23

## 2024-05-23 DIAGNOSIS — Z41.9 ENCOUNTER FOR PROCEDURE FOR PURPOSES OTHER THAN REMEDYING HEALTH STATE, UNSPECIFIED: ICD-10-CM

## 2024-05-23 PROCEDURE — ? CHEMICAL PEEL

## 2024-05-23 NOTE — PROCEDURE: CHEMICAL PEEL
Chemical Peel: PCA Peel
Comments: Anu had no concerns or complaints from last treatment. No changes in routine. Cleanse, polish, enzyme all under steam. Scalp massage. Exts in tzone. Dermaplaned lip only. Tweezed a few Brow Hairs. Zo sheet mask. Removed, massaged in. Prepped with Smoothing toner. Applied one heavy pass of PCA NoPell, finished with SPF. SKin did well with no erythema.
Number Of Layers: 1
Post-Care Instructions: I reviewed with the patient in detail post-care instructions. Patient should avoid sun exposure and wear sun protection. Also avoid any excessive work outs for 48 hrs.
Prep: The treated area was degreased with PCA Smoothing Toner.
Treatment Number: 0
Detail Level: Zone
Consent: Prior to the procedure, written consent was obtained and risks were reviewed, including but not limited to: redness, peeling, blistering, pigmentary change, scarring, infection, and pain.
Post Peel Care: Sun protection and post-care instructions were reviewed with the patient as well as expectations.

## 2024-07-09 ENCOUNTER — APPOINTMENT (RX ONLY)
Dept: URBAN - METROPOLITAN AREA CLINIC 58 | Facility: CLINIC | Age: 69
Setting detail: DERMATOLOGY
End: 2024-07-09

## 2024-07-09 DIAGNOSIS — Z41.9 ENCOUNTER FOR PROCEDURE FOR PURPOSES OTHER THAN REMEDYING HEALTH STATE, UNSPECIFIED: ICD-10-CM

## 2024-07-09 PROCEDURE — ? CHEMICAL PEEL

## 2024-07-09 NOTE — PROCEDURE: CHEMICAL PEEL
Comments: Anu is in for her usual treatment today with focus on extractions. No changes in routine. Cleanse, polish, PCA detox all under steam. Exts in tzone for congestion. Followed with ZO Sheet mask, removed. Prepped. Applied one heavy pass of PCA NoPeel. Skin did well. Finished with SPF.
Number Of Layers: 1
Detail Level: Zone
Consent: Prior to the procedure, written consent was obtained and risks were reviewed, including but not limited to: redness, peeling, blistering, pigmentary change, scarring, infection, and pain.
Post-Care Instructions: I reviewed with the patient in detail post-care instructions. Patient should avoid sun exposure and wear sun protection. Also avoid any excessive work outs for 48 hrs.
Chemical Peel: PCA Peel
Prep: The treated area was degreased with PCA Smoothing Toner.
Post Peel Care: Sun protection and post-care instructions were reviewed with the patient as well as expectations.

## 2024-07-30 ENCOUNTER — APPOINTMENT (RX ONLY)
Dept: URBAN - METROPOLITAN AREA CLINIC 58 | Facility: CLINIC | Age: 69
Setting detail: DERMATOLOGY
End: 2024-07-30

## 2024-07-30 DIAGNOSIS — Z41.9 ENCOUNTER FOR PROCEDURE FOR PURPOSES OTHER THAN REMEDYING HEALTH STATE, UNSPECIFIED: ICD-10-CM

## 2024-07-30 PROCEDURE — ? FACIAL

## 2024-07-30 ASSESSMENT — LOCATION ZONE DERM
LOCATION ZONE: LIP
LOCATION ZONE: FACE
LOCATION ZONE: NOSE

## 2024-07-30 ASSESSMENT — LOCATION DETAILED DESCRIPTION DERM
LOCATION DETAILED: LEFT CENTRAL MALAR CHEEK
LOCATION DETAILED: NASAL DORSUM
LOCATION DETAILED: LEFT LOWER CUTANEOUS LIP
LOCATION DETAILED: RIGHT CENTRAL MALAR CHEEK

## 2024-07-30 ASSESSMENT — LOCATION SIMPLE DESCRIPTION DERM
LOCATION SIMPLE: LEFT LIP
LOCATION SIMPLE: LEFT CHEEK
LOCATION SIMPLE: NOSE
LOCATION SIMPLE: RIGHT CHEEK

## 2024-07-30 NOTE — PROCEDURE: FACIAL
Exfoliation Type: exfoliant
Detail Level: Zone
Extraction Method: extractor
Facial Steaming: steamed

## 2024-08-30 ENCOUNTER — APPOINTMENT (RX ONLY)
Dept: URBAN - METROPOLITAN AREA CLINIC 58 | Facility: CLINIC | Age: 69
Setting detail: DERMATOLOGY
End: 2024-08-30

## 2024-08-30 DIAGNOSIS — Z41.9 ENCOUNTER FOR PROCEDURE FOR PURPOSES OTHER THAN REMEDYING HEALTH STATE, UNSPECIFIED: ICD-10-CM

## 2024-08-30 PROCEDURE — ? CHEMICAL PEEL

## 2024-08-30 NOTE — PROCEDURE: CHEMICAL PEEL
Detail Level: Zone
Post-Care Instructions: I reviewed with the patient in detail post-care instructions. Patient should avoid sun exposure and wear sun protection. Also avoid any excessive work outs for 48 hrs.
Chemical Peel: PCA Peel
Consent: Prior to the procedure, written consent was obtained and risks were reviewed, including but not limited to: redness, peeling, blistering, pigmentary change, scarring, infection, and pain.
Treatment Number: 0
Number Of Layers: 1
Post Peel Care: Sun protection and post-care instructions were reviewed with the patient as well as expectations.
Prep: The treated area was degreased with PCA Smoothing Toner.

## 2024-09-26 ENCOUNTER — APPOINTMENT (RX ONLY)
Dept: URBAN - METROPOLITAN AREA CLINIC 58 | Facility: CLINIC | Age: 69
Setting detail: DERMATOLOGY
End: 2024-09-26

## 2024-09-26 DIAGNOSIS — Z41.9 ENCOUNTER FOR PROCEDURE FOR PURPOSES OTHER THAN REMEDYING HEALTH STATE, UNSPECIFIED: ICD-10-CM

## 2024-09-26 PROCEDURE — ? CHEMICAL PEEL

## 2024-09-26 NOTE — PROCEDURE: CHEMICAL PEEL
Post-Care Instructions: I reviewed with the patient in detail post-care instructions. Patient should avoid sun exposure and wear sun protection. Also avoid any excessive work outs for 48 hrs.
Post Peel Care: Sun protection and post-care instructions were reviewed with the patient as well as expectations.
Number Of Layers: 1
Chemical Peel: PCA Peel
Consent: Prior to the procedure, written consent was obtained and risks were reviewed, including but not limited to: redness, peeling, blistering, pigmentary change, scarring, infection, and pain.
Prep: The treated area was degreased with PCA Smoothing Toner.
Treatment Number: 0
Detail Level: Zone

## 2024-12-31 ENCOUNTER — APPOINTMENT (OUTPATIENT)
Dept: URBAN - METROPOLITAN AREA CLINIC 58 | Facility: CLINIC | Age: 69
Setting detail: DERMATOLOGY
End: 2024-12-31

## 2024-12-31 DIAGNOSIS — Z41.9 ENCOUNTER FOR PROCEDURE FOR PURPOSES OTHER THAN REMEDYING HEALTH STATE, UNSPECIFIED: ICD-10-CM

## 2024-12-31 PROCEDURE — ? CHEMICAL PEEL

## 2024-12-31 NOTE — PROCEDURE: CHEMICAL PEEL
Prep: The treated area was degreased with PCA Smoothing Toner.
Detail Level: Zone
Treatment Number: 0
Post Peel Care: Sun protection and post-care instructions were reviewed with the patient as well as expectations.
Post-Care Instructions: I reviewed with the patient in detail post-care instructions. Patient should avoid sun exposure and wear sun protection. Also avoid any excessive work outs for 48 hrs.
Consent: Prior to the procedure, written consent was obtained and risks were reviewed, including but not limited to: redness, peeling, blistering, pigmentary change, scarring, infection, and pain.
Number Of Layers: 1
Chemical Peel: PCA Peel

## 2025-05-05 ENCOUNTER — APPOINTMENT (OUTPATIENT)
Dept: URBAN - METROPOLITAN AREA CLINIC 58 | Facility: CLINIC | Age: 70
Setting detail: DERMATOLOGY
End: 2025-05-05

## 2025-05-05 DIAGNOSIS — Z41.9 ENCOUNTER FOR PROCEDURE FOR PURPOSES OTHER THAN REMEDYING HEALTH STATE, UNSPECIFIED: ICD-10-CM

## 2025-05-05 PROCEDURE — ? CHEMICAL PEEL

## 2025-05-05 NOTE — PROCEDURE: CHEMICAL PEEL
Number Of Layers: 2
Detail Level: Zone
Comments: Anu is in today for her usual treatment. No changes in rotuine. Cleanse, polish, enzyme all under steam. No scalp massage. Exts in tzone. ZO sheet mask. Prepped. 2 layers of PCA Peel no HQ. Skin did well. Finished with SPF. Precautions reviewed. 
Consent: Prior to the procedure, written consent was obtained and risks were reviewed, including but not limited to: redness, peeling, blistering, pigmentary change, scarring, infection, and pain.
Post-Care Instructions: I reviewed with the patient in detail post-care instructions. Patient should avoid sun exposure and wear sun protection. Also avoid any excessive work outs for 48 hrs.
Chemical Peel: PCA Peel
Prep: The treated area was degreased with PCA Smoothing Toner.
Post Peel Care: Sun protection and post-care instructions were reviewed with the patient as well as expectations.

## 2025-05-29 ENCOUNTER — APPOINTMENT (OUTPATIENT)
Dept: URBAN - METROPOLITAN AREA CLINIC 58 | Facility: CLINIC | Age: 70
Setting detail: DERMATOLOGY
End: 2025-05-29

## 2025-05-29 DIAGNOSIS — Z41.9 ENCOUNTER FOR PROCEDURE FOR PURPOSES OTHER THAN REMEDYING HEALTH STATE, UNSPECIFIED: ICD-10-CM

## 2025-05-29 PROCEDURE — ? CHEMICAL PEEL

## 2025-05-29 NOTE — PROCEDURE: CHEMICAL PEEL
Consent: Prior to the procedure, written consent was obtained and risks were reviewed, including but not limited to: redness, peeling, blistering, pigmentary change, scarring, infection, and pain.
Comments: Anu is in today for another chemical peel treatment. No changes in rotuine. She is wanting to go stronger today and also skip the mask. Cleanse, polish, PCA Detox all under steam. No scalp. Exts in tzone. Microderm on chin and nose only. Prepped. Applied 2 layers of PCA Acne Peel Plus to face, waiting in between layers. Skin did well. Finished with SPF. Precautions reviewed. 
Prep: The treated area was degreased with PCA Smoothing Toner.
Detail Level: Zone
Post-Care Instructions: I reviewed with the patient in detail post-care instructions. Patient should avoid sun exposure and wear sun protection. Also avoid any excessive work outs for 48 hrs.
Post Peel Care: Sun protection and post-care instructions were reviewed with the patient as well as expectations.
Chemical Peel: PCA Peel
Number Of Layers: 2